# Patient Record
Sex: MALE | Race: OTHER
[De-identification: names, ages, dates, MRNs, and addresses within clinical notes are randomized per-mention and may not be internally consistent; named-entity substitution may affect disease eponyms.]

---

## 2021-12-31 ENCOUNTER — HOSPITAL ENCOUNTER (EMERGENCY)
Dept: HOSPITAL 47 - EC | Age: 41
Discharge: HOME | End: 2021-12-31
Payer: SELF-PAY

## 2021-12-31 VITALS
SYSTOLIC BLOOD PRESSURE: 128 MMHG | TEMPERATURE: 98.5 F | RESPIRATION RATE: 16 BRPM | DIASTOLIC BLOOD PRESSURE: 78 MMHG | HEART RATE: 78 BPM

## 2021-12-31 DIAGNOSIS — Z11.52: Primary | ICD-10-CM

## 2021-12-31 DIAGNOSIS — Z20.822: ICD-10-CM

## 2021-12-31 PROCEDURE — 87635 SARS-COV-2 COVID-19 AMP PRB: CPT

## 2021-12-31 PROCEDURE — 99282 EMERGENCY DEPT VISIT SF MDM: CPT

## 2021-12-31 NOTE — ED
General Adult HPI





- General


Chief complaint: Recheck/Abnormal Lab/Rx


Stated complaint: wants Covid test


Time Seen by Provider: 12/31/21 14:10


Source: patient


Mode of arrival: ambulatory


Limitations: no limitations





- History of Present Illness


Initial comments: 





41-year-old male presents to the emergency department requesting a COVID test.  

Patient states he does not have any symptoms and is not ill, but requires a 

negative test in order to enter [place].  Patient denies any further complaints 

at this time.





- Related Data


                                    Allergies











Allergy/AdvReac Type Severity Reaction Status Date / Time


 


No Known Allergies Allergy   Verified 12/31/21 13:22














Review of Systems


ROS Statement: 


Those systems with pertinent positive or pertinent negative responses have been 

documented in the HPI.





ROS Other: All systems not noted in ROS Statement are negative.





Past Medical History


Past Medical History: No Reported History


History of Any Multi-Drug Resistant Organisms: None Reported


Past Surgical History: No Surgical Hx Reported


Past Psychological History: No Psychological Hx Reported


Smoking Status: Never smoker


Past Alcohol Use History: None Reported


Past Drug Use History: None Reported





General Exam


Limitations: no limitations (All developed, well nourished male in no acute 

distress.)


General appearance: alert, in no apparent distress


ENT exam: Present: normal exam, normal oropharynx, mucous membranes moist


Respiratory exam: Present: normal lung sounds bilaterally.  Absent: respiratory 

distress, wheezes, rales, rhonchi, stridor


Cardiovascular Exam: Present: regular rate, normal rhythm, normal heart sounds. 

Absent: systolic murmur, diastolic murmur, rubs, gallop, clicks


GI/Abdominal exam: Present: soft, normal bowel sounds.  Absent: distended, 

tenderness, guarding, rebound, rigid


Neurological exam: Present: alert, oriented X3, CN II-XII intact


Psychiatric exam: Present: normal affect, normal mood





Course


                                   Vital Signs











  12/31/21 12/31/21





  13:18 14:32


 


Temperature 97.9 F 98.5 F


 


Pulse Rate 105 H 78


 


Respiratory 15 16





Rate  


 


Blood Pressure 141/98 128/78


 


O2 Sat by Pulse 100 98





Oximetry  














Medical Decision Making





- Medical Decision Making





41-year-old male with no significant past medical history presents to the 

emergency department requesting a Covid test in order to enter [place].  His 

physical exam findings are normal.  Patient does not have any complaints.  His 

Covid test was negative.  Patient will be discharged home with appropriate 

paperwork and instructed to follow-up with PCP if needed.  Return parameters 

were discussed.  Patient verbalizes understanding and agrees with this plan.





- Lab Data


                                   Lab Results











  12/31/21 Range/Units





  13:24 


 


Coronavirus (PCR)  Not Detected  (Not Detectd)  














Disposition


Clinical Impression: 


 Normal exam





Disposition: HOME SELF-CARE


Condition: Stable


Instructions (If sedation given, give patient instructions):  Normal Exam (ED)


Additional Instructions: 


Your COVID test is negative.


If you require any further medical care, please follow-up with your primary care

provider or return to the emergency department.


Is patient prescribed a controlled substance at d/c from ED?: No


Referrals: 


Nonstaff,Physician [Primary Care Provider] - 1-2 days


Time of Disposition: 14:30
0